# Patient Record
Sex: MALE | Race: WHITE
[De-identification: names, ages, dates, MRNs, and addresses within clinical notes are randomized per-mention and may not be internally consistent; named-entity substitution may affect disease eponyms.]

---

## 2017-03-08 ENCOUNTER — PRIOR ORIGINAL RECORDS (OUTPATIENT)
Dept: OTHER | Age: 47
End: 2017-03-08

## 2017-03-13 ENCOUNTER — MYAURORA ACCOUNT LINK (OUTPATIENT)
Dept: OTHER | Age: 47
End: 2017-03-13

## 2017-03-14 ENCOUNTER — PRIOR ORIGINAL RECORDS (OUTPATIENT)
Dept: OTHER | Age: 47
End: 2017-03-14

## 2017-04-12 ENCOUNTER — MYAURORA ACCOUNT LINK (OUTPATIENT)
Dept: OTHER | Age: 47
End: 2017-04-12

## 2017-04-12 ENCOUNTER — PRIOR ORIGINAL RECORDS (OUTPATIENT)
Dept: OTHER | Age: 47
End: 2017-04-12

## 2017-06-08 ENCOUNTER — PRIOR ORIGINAL RECORDS (OUTPATIENT)
Dept: OTHER | Age: 47
End: 2017-06-08

## 2018-03-20 ENCOUNTER — HOSPITAL (OUTPATIENT)
Dept: OTHER | Age: 48
End: 2018-03-20
Attending: FAMILY MEDICINE

## 2018-10-27 ENCOUNTER — APPOINTMENT (OUTPATIENT)
Dept: GENERAL RADIOLOGY | Facility: HOSPITAL | Age: 48
DRG: 310 | End: 2018-10-27
Attending: EMERGENCY MEDICINE
Payer: COMMERCIAL

## 2018-10-27 ENCOUNTER — HOSPITAL ENCOUNTER (OUTPATIENT)
Facility: HOSPITAL | Age: 48
Setting detail: OBSERVATION
Discharge: HOME | DRG: 310 | End: 2018-10-28
Attending: EMERGENCY MEDICINE | Admitting: HOSPITALIST
Payer: COMMERCIAL

## 2018-10-27 DIAGNOSIS — I48.92 NEW ONSET ATRIAL FLUTTER (HCC): Primary | ICD-10-CM

## 2018-10-27 PROBLEM — R73.9 HYPERGLYCEMIA: Status: ACTIVE | Noted: 2018-10-27

## 2018-10-27 PROCEDURE — 80048 BASIC METABOLIC PNL TOTAL CA: CPT | Performed by: EMERGENCY MEDICINE

## 2018-10-27 PROCEDURE — 85610 PROTHROMBIN TIME: CPT | Performed by: EMERGENCY MEDICINE

## 2018-10-27 PROCEDURE — 85730 THROMBOPLASTIN TIME PARTIAL: CPT | Performed by: EMERGENCY MEDICINE

## 2018-10-27 PROCEDURE — 99285 EMERGENCY DEPT VISIT HI MDM: CPT

## 2018-10-27 PROCEDURE — 93010 ELECTROCARDIOGRAM REPORT: CPT | Performed by: EMERGENCY MEDICINE

## 2018-10-27 PROCEDURE — 84443 ASSAY THYROID STIM HORMONE: CPT | Performed by: HOSPITALIST

## 2018-10-27 PROCEDURE — 83036 HEMOGLOBIN GLYCOSYLATED A1C: CPT | Performed by: HOSPITALIST

## 2018-10-27 PROCEDURE — 96365 THER/PROPH/DIAG IV INF INIT: CPT

## 2018-10-27 PROCEDURE — 96375 TX/PRO/DX INJ NEW DRUG ADDON: CPT

## 2018-10-27 PROCEDURE — 85025 COMPLETE CBC W/AUTO DIFF WBC: CPT | Performed by: EMERGENCY MEDICINE

## 2018-10-27 PROCEDURE — 96366 THER/PROPH/DIAG IV INF ADDON: CPT

## 2018-10-27 PROCEDURE — 81003 URINALYSIS AUTO W/O SCOPE: CPT | Performed by: EMERGENCY MEDICINE

## 2018-10-27 PROCEDURE — 71045 X-RAY EXAM CHEST 1 VIEW: CPT | Performed by: EMERGENCY MEDICINE

## 2018-10-27 PROCEDURE — 84484 ASSAY OF TROPONIN QUANT: CPT | Performed by: EMERGENCY MEDICINE

## 2018-10-27 PROCEDURE — 96376 TX/PRO/DX INJ SAME DRUG ADON: CPT

## 2018-10-27 PROCEDURE — 93005 ELECTROCARDIOGRAM TRACING: CPT

## 2018-10-27 RX ORDER — SODIUM CHLORIDE 0.9 % (FLUSH) 0.9 %
3 SYRINGE (ML) INJECTION AS NEEDED
Status: DISCONTINUED | OUTPATIENT
Start: 2018-10-27 | End: 2018-10-28

## 2018-10-27 RX ORDER — FIBER
1 TABLET ORAL DAILY
COMMUNITY
End: 2020-09-17

## 2018-10-27 RX ORDER — ONDANSETRON 2 MG/ML
4 INJECTION INTRAMUSCULAR; INTRAVENOUS EVERY 6 HOURS PRN
Status: DISCONTINUED | OUTPATIENT
Start: 2018-10-27 | End: 2018-10-28

## 2018-10-27 RX ORDER — ACETAMINOPHEN 325 MG/1
650 TABLET ORAL EVERY 6 HOURS PRN
Status: DISCONTINUED | OUTPATIENT
Start: 2018-10-27 | End: 2018-10-28

## 2018-10-27 RX ORDER — DILTIAZEM HYDROCHLORIDE 5 MG/ML
INJECTION INTRAVENOUS
Status: COMPLETED
Start: 2018-10-27 | End: 2018-10-27

## 2018-10-27 RX ORDER — SODIUM CHLORIDE 9 MG/ML
INJECTION, SOLUTION INTRAVENOUS CONTINUOUS
Status: DISCONTINUED | OUTPATIENT
Start: 2018-10-27 | End: 2018-10-28

## 2018-10-27 RX ORDER — METOCLOPRAMIDE HYDROCHLORIDE 5 MG/ML
10 INJECTION INTRAMUSCULAR; INTRAVENOUS EVERY 8 HOURS PRN
Status: DISCONTINUED | OUTPATIENT
Start: 2018-10-27 | End: 2018-10-28

## 2018-10-27 RX ORDER — DILTIAZEM HYDROCHLORIDE 5 MG/ML
10 INJECTION INTRAVENOUS ONCE
Status: COMPLETED | OUTPATIENT
Start: 2018-10-27 | End: 2018-10-27

## 2018-10-27 RX ORDER — METOPROLOL TARTRATE 5 MG/5ML
5 INJECTION INTRAVENOUS EVERY 5 MIN PRN
Status: COMPLETED | OUTPATIENT
Start: 2018-10-27 | End: 2018-10-27

## 2018-10-27 NOTE — H&P
[facility] Hospitalist H&P       CC: Patient presents with:  Arrythmia/Palpitations (cardiovascular)     PCP: Jude Bryan MD    Date of Admission: 10/27/2018 11:39 AM    ASSESSMENT / PLAN:     Mr. Sandy Roy is a 53 yo M with no known PMH who presented with palp HISTORY  2005     eye surgery due to injury    • OTHER SURGICAL HISTORY Right 2014    left sh arthroscopy with distal clavicle resection   • SHOULDER ARTHROSCOPY WITH BANKART REPAIR Left 2/4/2014    Performed by Robbie Marin MD at [address]   • [address] [place] [address] 10/27/2018    CO2 23 10/27/2018     10/27/2018    CA 9.4 10/27/2018    PTT 27.9 10/27/2018    INR 1.0 10/27/2018    TROP 0.00 10/27/2018       Recent Labs   Lab  10/27/18   1155   TROP  0.00       Additional Diagnostics: ECG: Afib/flutter with RVR,

## 2018-10-27 NOTE — ED INITIAL ASSESSMENT (HPI)
Pt states was on break at work, had a diet redbull, now complaining of heart palpitations, feeling his heart racing.  History of same, has worn holter monitor with no specific findings in the past. Denies ever needing medication intervention for abnormal he

## 2018-10-27 NOTE — CONSULTS
Reason for Consultation: Atrial flutter    Assessment/Plan:     1. Atrial flutter  2.  Chest pain  - no evidence of ACS      PLAN:  - Eliquis  - Lopressor (IV + po)  - IV cardizem  - trend troponin  - cardiovert in AM if still Aflutter  - OP Echo      HPI: Vitals for the past 24 hrs:   BP Temp Temp src Pulse Resp SpO2 Weight   10/27/18 1219 — — — — — 96 % —   10/27/18 1133 (!) 134/94 97.8 °F (36.6 °C) Temporal (!) 146 26 100 % 205 lb (93 kg)     No intake or output data in the 24 hours ending 10/27/18 1222

## 2018-10-27 NOTE — ED PROVIDER NOTES
Patient Seen in: [facility] AND [facility] Emergency Department    History   Patient presents with:  Arrythmia/Palpitations (cardiovascular)    Stated Complaint: CP    HPI    Patient presents to the emergency department today complaining of palpitations.   He sta Temp 98.9 °F (37.2 °C) (Oral)   Resp 15   Wt 93 kg   SpO2 94%   BMI 27.05 kg/m²         Physical Exam   Constitutional: He is oriented to person, place, and time. He appears well-developed and well-nourished. No distress. HENT:   Head: Normocephalic. (BNP)   CBC W/ DIFFERENTIAL     EKG    Rate, intervals and axes as noted on EKG Report.   Rate: 150 bpm  Rhythm: Atrial flutter  Reading: Nonspecific ST changes, abnormal                    MDM   Pulse Ox: 100%, Normal,     Cardiac Monitor: Pulse Readings f

## 2018-10-27 NOTE — PLAN OF CARE
Updated Dr. Jean Marie Lo that patient converted to NSR HR 70s. MD stated she will update cardiology.

## 2018-10-28 VITALS
BODY MASS INDEX: 27 KG/M2 | TEMPERATURE: 98 F | RESPIRATION RATE: 16 BRPM | DIASTOLIC BLOOD PRESSURE: 71 MMHG | OXYGEN SATURATION: 97 % | WEIGHT: 205.13 LBS | HEART RATE: 56 BPM | SYSTOLIC BLOOD PRESSURE: 113 MMHG

## 2018-10-28 PROCEDURE — 80048 BASIC METABOLIC PNL TOTAL CA: CPT | Performed by: HOSPITALIST

## 2018-10-28 PROCEDURE — 93005 ELECTROCARDIOGRAM TRACING: CPT

## 2018-10-28 PROCEDURE — 85025 COMPLETE CBC W/AUTO DIFF WBC: CPT | Performed by: HOSPITALIST

## 2018-10-28 PROCEDURE — 93010 ELECTROCARDIOGRAM REPORT: CPT | Performed by: HOSPITALIST

## 2018-10-28 RX ORDER — METOPROLOL SUCCINATE 25 MG/1
25 TABLET, EXTENDED RELEASE ORAL
Qty: 30 TABLET | Refills: 0 | Status: SHIPPED | OUTPATIENT
Start: 2018-10-29 | End: 2019-01-15

## 2018-10-28 RX ORDER — METOPROLOL SUCCINATE 25 MG/1
25 TABLET, EXTENDED RELEASE ORAL
Status: DISCONTINUED | OUTPATIENT
Start: 2018-10-29 | End: 2018-10-28

## 2018-10-28 NOTE — DISCHARGE SUMMARY
General Medicine Discharge Summary     Patient ID:  Sarah Motley  52year old  11/22/1970    Admit date: 10/27/2018    Discharge date and time: 10/28/18    Attending Physician: Tanmay Rodrigez MD     Primary Care Physician: Tabitha Grewal MD     [facility] sensory intact  Psych: Affect- normal  SKIN: warm, dry  EXT: no edema    Operative Procedures:      Imaging: Xr Chest Ap Portable  (cpt=71045)    Result Date: 10/27/2018  CONCLUSION: Normal examination.       Dictated by (CST): Skyla Garcia MD on

## 2018-10-28 NOTE — PROGRESS NOTES
Assessment and Plan:       1. Atrial flutter  - converted yesterday evening  2.  Chest pain  - no evidence of ACS      PLAN:  - home on Eliquis and Toprol 25  - OP Echo (will arrange)  - follow-up in 2-3 weeks      Subjective:     No c/o    Objective:   T 10/27/2018 at 20:24 by Aicha Leon MD

## 2018-10-29 ENCOUNTER — TELEPHONE (OUTPATIENT)
Dept: MEDSURG UNIT | Facility: HOSPITAL | Age: 48
End: 2018-10-29

## 2018-12-06 ENCOUNTER — DIAGNOSTIC TRANS (OUTPATIENT)
Dept: OTHER | Age: 48
End: 2018-12-06

## 2018-12-06 ENCOUNTER — HOSPITAL (OUTPATIENT)
Dept: OTHER | Age: 48
End: 2018-12-06
Attending: HOSPITALIST

## 2018-12-06 LAB
ALBUMIN SERPL-MCNC: 4 GM/DL (ref 3.6–5.1)
ALBUMIN/GLOB SERPL: 1.1 {RATIO} (ref 1–2.4)
ALP SERPL-CCNC: 76 UNIT/L (ref 45–117)
ALT SERPL-CCNC: 62 UNIT/L
ANALYZER ANC (IANC): NORMAL
ANION GAP SERPL CALC-SCNC: 14 MMOL/L (ref 10–20)
AST SERPL-CCNC: 33 UNIT/L
BASOPHILS # BLD: 0.1 THOUSAND/MCL (ref 0–0.3)
BASOPHILS NFR BLD: 1 %
BILIRUB SERPL-MCNC: 0.6 MG/DL (ref 0.2–1)
BUN SERPL-MCNC: 14 MG/DL (ref 6–20)
BUN/CREAT SERPL: 15 (ref 7–25)
CALCIUM SERPL-MCNC: 9.2 MG/DL (ref 8.4–10.2)
CHLORIDE: 102 MMOL/L (ref 98–107)
CO2 SERPL-SCNC: 27 MMOL/L (ref 21–32)
CREAT SERPL-MCNC: 0.92 MG/DL (ref 0.67–1.17)
DIFFERENTIAL METHOD BLD: NORMAL
EOSINOPHIL # BLD: 0.2 THOUSAND/MCL (ref 0.1–0.5)
EOSINOPHIL NFR BLD: 2 %
ERYTHROCYTE [DISTWIDTH] IN BLOOD: 11.9 % (ref 11–15)
GLOBULIN SER-MCNC: 3.7 GM/DL (ref 2–4)
GLUCOSE SERPL-MCNC: 105 MG/DL (ref 65–99)
HEMATOCRIT: 49 % (ref 39–51)
HGB BLD-MCNC: 16.5 GM/DL (ref 13–17)
IMM GRANULOCYTES # BLD AUTO: 0 THOUSAND/MCL (ref 0–0.2)
IMM GRANULOCYTES NFR BLD: 0 %
LYMPHOCYTES # BLD: 2.5 THOUSAND/MCL (ref 1–4.8)
LYMPHOCYTES NFR BLD: 24 %
MAGNESIUM SERPL-MCNC: 2.2 MG/DL (ref 1.7–2.4)
MCH RBC QN AUTO: 30.8 PG (ref 26–34)
MCHC RBC AUTO-ENTMCNC: 33.7 GM/DL (ref 32–36.5)
MCV RBC AUTO: 91.4 FL (ref 78–100)
MONOCYTES # BLD: 0.8 THOUSAND/MCL (ref 0.3–0.9)
MONOCYTES NFR BLD: 7 %
NEUTROPHILS # BLD: 6.9 THOUSAND/MCL (ref 1.8–7.7)
NEUTROPHILS NFR BLD: 66 %
NEUTS SEG NFR BLD: NORMAL %
NRBC (NRBCRE): 0 /100 WBC
PLATELET # BLD: 252 THOUSAND/MCL (ref 140–450)
POTASSIUM SERPL-SCNC: 4.2 MMOL/L (ref 3.4–5.1)
PROT SERPL-MCNC: 7.7 GM/DL (ref 6.4–8.2)
RBC # BLD: 5.36 MILLION/MCL (ref 4.5–5.9)
SODIUM SERPL-SCNC: 139 MMOL/L (ref 135–145)
TROPONIN I SERPL HS-MCNC: <0.02 NG/ML
TSH SERPL-ACNC: 1.35 MCUNIT/ML (ref 0.35–5)
WBC # BLD: 10.5 THOUSAND/MCL (ref 4.2–11)

## 2018-12-07 ENCOUNTER — DIAGNOSTIC TRANS (OUTPATIENT)
Dept: OTHER | Age: 48
End: 2018-12-07

## 2018-12-07 ENCOUNTER — CHARTING TRANS (OUTPATIENT)
Dept: OTHER | Age: 48
End: 2018-12-07

## 2019-01-07 ENCOUNTER — HOSPITAL (OUTPATIENT)
Dept: OTHER | Age: 49
End: 2019-01-07
Attending: EMERGENCY MEDICINE

## 2019-01-07 ENCOUNTER — HOSPITAL (OUTPATIENT)
Dept: OTHER | Age: 49
End: 2019-01-07

## 2019-01-07 ENCOUNTER — DIAGNOSTIC TRANS (OUTPATIENT)
Dept: OTHER | Age: 49
End: 2019-01-07

## 2019-01-07 LAB
ANALYZER ANC (IANC): NORMAL
ANION GAP SERPL CALC-SCNC: 15 MMOL/L (ref 10–20)
APTT PPP: 28 SECONDS (ref 22–32)
APTT PPP: NORMAL S
BASOPHILS # BLD: 0.1 THOUSAND/MCL (ref 0–0.3)
BASOPHILS NFR BLD: 1 %
BUN SERPL-MCNC: 16 MG/DL (ref 6–20)
BUN/CREAT SERPL: 19 (ref 7–25)
CALCIUM SERPL-MCNC: 8.9 MG/DL (ref 8.4–10.2)
CHLORIDE: 101 MMOL/L (ref 98–107)
CO2 SERPL-SCNC: 26 MMOL/L (ref 21–32)
CREAT SERPL-MCNC: 0.86 MG/DL (ref 0.67–1.17)
DIFFERENTIAL METHOD BLD: NORMAL
EOSINOPHIL # BLD: 0.2 THOUSAND/MCL (ref 0.1–0.5)
EOSINOPHIL NFR BLD: 2 %
ERYTHROCYTE [DISTWIDTH] IN BLOOD: 11.8 % (ref 11–15)
GLUCOSE SERPL-MCNC: 100 MG/DL (ref 65–99)
HEMATOCRIT: 43.1 % (ref 39–51)
HGB BLD-MCNC: 14.7 GM/DL (ref 13–17)
IMM GRANULOCYTES # BLD AUTO: 0 THOUSAND/MCL (ref 0–0.2)
IMM GRANULOCYTES NFR BLD: 0 %
INR PPP: 1
LYMPHOCYTES # BLD: 2.1 THOUSAND/MCL (ref 1–4.8)
LYMPHOCYTES NFR BLD: 24 %
MAGNESIUM SERPL-MCNC: 2.3 MG/DL (ref 1.7–2.4)
MCH RBC QN AUTO: 30.4 PG (ref 26–34)
MCHC RBC AUTO-ENTMCNC: 34.1 GM/DL (ref 32–36.5)
MCV RBC AUTO: 89 FL (ref 78–100)
MONOCYTES # BLD: 0.6 THOUSAND/MCL (ref 0.3–0.9)
MONOCYTES NFR BLD: 7 %
NEUTROPHILS # BLD: 5.9 THOUSAND/MCL (ref 1.8–7.7)
NEUTROPHILS NFR BLD: 66 %
NEUTS SEG NFR BLD: NORMAL %
NRBC (NRBCRE): 0 /100 WBC
PLATELET # BLD: 209 THOUSAND/MCL (ref 140–450)
POTASSIUM SERPL-SCNC: 3.8 MMOL/L (ref 3.4–5.1)
PROTHROMBIN TIME: 10.6 SECONDS (ref 9.7–11.8)
PROTHROMBIN TIME: NORMAL
RBC # BLD: 4.84 MILLION/MCL (ref 4.5–5.9)
SODIUM SERPL-SCNC: 138 MMOL/L (ref 135–145)
TROPONIN I SERPL HS-MCNC: <0.02 NG/ML
WBC # BLD: 8.8 THOUSAND/MCL (ref 4.2–11)

## 2019-01-31 ENCOUNTER — HOSPITAL (OUTPATIENT)
Dept: OTHER | Age: 49
End: 2019-01-31
Attending: INTERNAL MEDICINE

## 2019-03-01 VITALS
WEIGHT: 208 LBS | HEIGHT: 73 IN | BODY MASS INDEX: 27.57 KG/M2 | HEART RATE: 84 BPM | DIASTOLIC BLOOD PRESSURE: 68 MMHG | SYSTOLIC BLOOD PRESSURE: 110 MMHG

## 2019-03-01 VITALS
DIASTOLIC BLOOD PRESSURE: 73 MMHG | HEIGHT: 73 IN | BODY MASS INDEX: 26.51 KG/M2 | SYSTOLIC BLOOD PRESSURE: 122 MMHG | HEART RATE: 73 BPM | WEIGHT: 200 LBS

## 2019-03-31 ENCOUNTER — HOSPITAL (OUTPATIENT)
Dept: OTHER | Age: 49
End: 2019-03-31
Attending: EMERGENCY MEDICINE

## 2019-03-31 LAB
FLUAV AG SPEC QL IA: NEGATIVE
FLUBV AG SPEC QL IF: ABNORMAL
FLUBV AG SPEC QL IF: POSITIVE
SPECIMEN SOURCE: ABNORMAL

## 2021-03-21 PROCEDURE — 88341 IMHCHEM/IMCYTCHM EA ADD ANTB: CPT | Performed by: INTERNAL MEDICINE

## 2021-03-21 PROCEDURE — 88342 IMHCHEM/IMCYTCHM 1ST ANTB: CPT | Performed by: INTERNAL MEDICINE

## 2024-03-04 ENCOUNTER — WALK IN (OUTPATIENT)
Dept: URGENT CARE | Age: 54
End: 2024-03-04

## 2024-03-04 VITALS
OXYGEN SATURATION: 98 % | TEMPERATURE: 98.2 F | DIASTOLIC BLOOD PRESSURE: 86 MMHG | WEIGHT: 210 LBS | HEART RATE: 84 BPM | RESPIRATION RATE: 16 BRPM | BODY MASS INDEX: 27.71 KG/M2 | SYSTOLIC BLOOD PRESSURE: 137 MMHG

## 2024-03-04 DIAGNOSIS — H10.9 CONJUNCTIVITIS OF LEFT EYE, UNSPECIFIED CONJUNCTIVITIS TYPE: ICD-10-CM

## 2024-03-04 DIAGNOSIS — H57.89 REDNESS OF LEFT EYE: Primary | ICD-10-CM

## 2024-03-04 PROCEDURE — 10002801 HB RX 250 W/O HCPCS: Performed by: EMERGENCY MEDICINE

## 2024-03-04 PROCEDURE — 99203 OFFICE O/P NEW LOW 30 MIN: CPT

## 2024-03-04 RX ORDER — TETRACAINE HYDROCHLORIDE 5 MG/ML
1 SOLUTION OPHTHALMIC ONCE
Status: COMPLETED | OUTPATIENT
Start: 2024-03-04 | End: 2024-03-04

## 2024-03-04 RX ORDER — CIPROFLOXACIN HYDROCHLORIDE 3.5 MG/ML
1 SOLUTION/ DROPS TOPICAL EVERY 4 HOURS
Qty: 1.5 ML | Refills: 0 | Status: SHIPPED | OUTPATIENT
Start: 2024-03-04 | End: 2024-03-09

## 2024-03-04 RX ORDER — METOPROLOL SUCCINATE 50 MG/1
50 TABLET, EXTENDED RELEASE ORAL 2 TIMES DAILY
COMMUNITY

## 2024-03-04 RX ADMIN — FLUORESCEIN SODIUM 1 STRIP: 1 STRIP OPHTHALMIC at 19:16

## 2024-03-04 RX ADMIN — TETRACAINE HYDROCHLORIDE 1 DROP: 5 SOLUTION OPHTHALMIC at 19:16

## 2024-03-04 ASSESSMENT — VISUAL ACUITY
OS_CC: 20/30
OD_CC: 20/40

## 2024-03-04 ASSESSMENT — PAIN SCALES - GENERAL: PAINLEVEL_OUTOF10: 2
